# Patient Record
Sex: FEMALE | Race: WHITE | NOT HISPANIC OR LATINO | Employment: UNEMPLOYED | URBAN - METROPOLITAN AREA
[De-identification: names, ages, dates, MRNs, and addresses within clinical notes are randomized per-mention and may not be internally consistent; named-entity substitution may affect disease eponyms.]

---

## 2023-03-08 ENCOUNTER — TELEPHONE (OUTPATIENT)
Dept: PRIMARY CARE | Facility: CLINIC | Age: 83
End: 2023-03-08
Payer: MEDICARE

## 2023-03-08 NOTE — TELEPHONE ENCOUNTER
Patients daughter states she is retaining water and has gained 8 lbs in the last 5 days. She would like to know what she could do to help her. Please advise

## 2023-03-10 ENCOUNTER — TELEPHONE (OUTPATIENT)
Dept: PRIMARY CARE | Facility: CLINIC | Age: 83
End: 2023-03-10
Payer: MEDICARE

## 2023-04-03 DIAGNOSIS — R60.9 EDEMA, UNSPECIFIED: ICD-10-CM

## 2023-04-04 RX ORDER — FUROSEMIDE 40 MG/1
TABLET ORAL
Qty: 90 TABLET | Refills: 1 | Status: SHIPPED | OUTPATIENT
Start: 2023-04-04 | End: 2023-07-20 | Stop reason: SDUPTHER

## 2023-04-07 DIAGNOSIS — G25.81 RESTLESS LEGS SYNDROME: ICD-10-CM

## 2023-04-07 DIAGNOSIS — G47.00 INSOMNIA, UNSPECIFIED: ICD-10-CM

## 2023-04-07 DIAGNOSIS — F32.A DEPRESSION, UNSPECIFIED: ICD-10-CM

## 2023-04-07 DIAGNOSIS — K21.9 GASTRO-ESOPHAGEAL REFLUX DISEASE WITHOUT ESOPHAGITIS: ICD-10-CM

## 2023-04-07 RX ORDER — SERTRALINE HYDROCHLORIDE 100 MG/1
TABLET, FILM COATED ORAL
Qty: 90 TABLET | Refills: 1 | Status: SHIPPED | OUTPATIENT
Start: 2023-04-07

## 2023-04-07 RX ORDER — MIRTAZAPINE 7.5 MG/1
TABLET, FILM COATED ORAL
Qty: 90 TABLET | Refills: 1 | Status: SHIPPED | OUTPATIENT
Start: 2023-04-07 | End: 2023-07-20 | Stop reason: SDUPTHER

## 2023-04-07 RX ORDER — ROPINIROLE 2 MG/1
TABLET, FILM COATED ORAL
Qty: 90 TABLET | Refills: 1 | Status: SHIPPED | OUTPATIENT
Start: 2023-04-07

## 2023-04-07 RX ORDER — OMEPRAZOLE 40 MG/1
CAPSULE, DELAYED RELEASE ORAL
Qty: 90 CAPSULE | Refills: 1 | Status: SHIPPED | OUTPATIENT
Start: 2023-04-07

## 2023-04-08 DIAGNOSIS — E11.65 TYPE 2 DIABETES MELLITUS WITH HYPERGLYCEMIA, WITHOUT LONG-TERM CURRENT USE OF INSULIN (MULTI): Primary | ICD-10-CM

## 2023-04-12 ENCOUNTER — PATIENT OUTREACH (OUTPATIENT)
Dept: PRIMARY CARE | Facility: CLINIC | Age: 83
End: 2023-04-12
Payer: MEDICARE

## 2023-04-12 NOTE — PROGRESS NOTES
Discharge Facility: [facility]  Discharge Diagnosis: CHF  Admission Date: 8 Apr 23  Discharge Date: 11 Apr 23    PCP appt: [address] [place] 23    Engagement  Call Start Time: 1117 (4/12/2023 11:28 AM)    Medications  Medications reviewed with patient/caregiver?: Yes (permission to speak about medical issues obtained from patient so that i could speak with daughter who takes care of patient.) (4/12/2023 11:28 AM)  Is the patient having any side effects they believe may be caused by any medication additions or changes?: No (4/12/2023 11:28 AM)  Does the patient have all medications ordered at discharge?: No (pharmacy was in process of getting a few of the discharge meds together for patient. should be completed by today per daughter.) (4/12/2023 11:28 AM)  Care Management Interventions: No intervention needed (4/12/2023 11:28 AM)  Prescription Comments: daughter has been helping patient with meds for several years. does not wish to be contacted by pharmacy (4/12/2023 11:28 AM)  Is the patient taking all medications as directed (includes completed medication regime)?: Yes (4/12/2023 11:28 AM)    Appointments  Does the patient have a primary care provider?: Yes (4/12/2023 11:28 AM)  Care Management Interventions: Verified appointment date/time/provider (4/12/2023 11:28 AM)  Has the patient kept scheduled appointments due by today?: Yes (4/12/2023 11:28 AM)  Care Management Interventions: Advised patient to keep appointment; Educated on importance of keeping appointment (4/12/2023 11:28 AM)    Self Management  What is the home health agency?: daughter stating that HHC was already set up for patient prior to hospital stay. daughter did request a RN to visit. (4/12/2023 11:28 AM)  Has home health visited the patient within 72 hours of discharge?: Yes (4/12/2023 11:28 AM)  Home Health Interventions: Called home health agency (4/12/2023 11:28 AM)  What Durable Medical Equipment (DME) was ordered?: note states home O2, but daughter says  that was not needed on discharge due to patient maintaining high SpO2 levels (4/12/2023 11:28 AM)  Follow Up Tasks: Home Health (4/12/2023 11:28 AM)    Patient Teaching  Does the patient have access to their discharge instructions?: Yes (4/12/2023 11:28 AM)  Care Management Interventions: Reviewed instructions with patient (4/12/2023 11:28 AM)  What is the patient's perception of their health status since discharge?: Improving (4/12/2023 11:28 AM)  Is the patient/caregiver able to teach back the hierarchy of who to call/visit for symptoms/problems? PCP, Specialist, Home Health nurse, Urgent Care, ED, 911: Yes (4/12/2023 11:28 AM)    Wrap Up  Call End Time: 1133 (4/12/2023 11:28 AM)     [facility]HC contacted. Verified that patient was on their list. HHC to contact number in chart later today.

## 2023-04-14 ENCOUNTER — DOCUMENTATION (OUTPATIENT)
Dept: PRIMARY CARE | Facility: CLINIC | Age: 83
End: 2023-04-14
Payer: MEDICARE

## 2023-04-14 LAB
ANION GAP IN SER/PLAS: 19 MMOL/L (ref 10–20)
CALCIUM (MG/DL) IN SER/PLAS: 8.7 MG/DL (ref 8.6–10.6)
CARBON DIOXIDE, TOTAL (MMOL/L) IN SER/PLAS: 27 MMOL/L (ref 21–32)
CHLORIDE (MMOL/L) IN SER/PLAS: 98 MMOL/L (ref 98–107)
CREATININE (MG/DL) IN SER/PLAS: 1.35 MG/DL (ref 0.5–1.05)
GFR FEMALE: 39 ML/MIN/1.73M2
GLUCOSE (MG/DL) IN SER/PLAS: 331 MG/DL (ref 74–99)
POTASSIUM (MMOL/L) IN SER/PLAS: 3.8 MMOL/L (ref 3.5–5.3)
SODIUM (MMOL/L) IN SER/PLAS: 140 MMOL/L (ref 136–145)
UREA NITROGEN (MG/DL) IN SER/PLAS: 44 MG/DL (ref 6–23)

## 2023-04-14 NOTE — PROGRESS NOTES
Spoke with Jose from  Homecare  She had talked with Gilma's dtgr  Katty who said that she 'passed the walking test before discharge and was NOT sent home on Oxygen    Labs drawn today.  /56 and t 116/56  Pulse 66    128# today  Oxygenation 96-97%    Has follow up with me on 4/19

## 2023-04-19 ENCOUNTER — OFFICE VISIT (OUTPATIENT)
Dept: PRIMARY CARE | Facility: CLINIC | Age: 83
End: 2023-04-19
Payer: MEDICARE

## 2023-04-19 VITALS
HEIGHT: 60 IN | WEIGHT: 130 LBS | BODY MASS INDEX: 25.52 KG/M2 | SYSTOLIC BLOOD PRESSURE: 120 MMHG | DIASTOLIC BLOOD PRESSURE: 60 MMHG | TEMPERATURE: 97.6 F

## 2023-04-19 DIAGNOSIS — E11.65 TYPE 2 DIABETES MELLITUS WITH HYPERGLYCEMIA, WITHOUT LONG-TERM CURRENT USE OF INSULIN (MULTI): ICD-10-CM

## 2023-04-19 DIAGNOSIS — I50.9 CONGESTIVE HEART FAILURE, UNSPECIFIED HF CHRONICITY, UNSPECIFIED HEART FAILURE TYPE (MULTI): ICD-10-CM

## 2023-04-19 DIAGNOSIS — I10 PRIMARY HYPERTENSION: ICD-10-CM

## 2023-04-19 DIAGNOSIS — Z09 HOSPITAL DISCHARGE FOLLOW-UP: Primary | ICD-10-CM

## 2023-04-19 PROBLEM — M25.512 LEFT SHOULDER PAIN: Status: ACTIVE | Noted: 2023-04-19

## 2023-04-19 PROBLEM — I50.33 ACUTE ON CHRONIC DIASTOLIC HEART FAILURE (MULTI): Status: ACTIVE | Noted: 2023-04-19

## 2023-04-19 PROBLEM — E83.51 HYPOCALCEMIA: Status: ACTIVE | Noted: 2023-04-19

## 2023-04-19 PROBLEM — E11.9 TYPE 2 DIABETES MELLITUS (MULTI): Status: ACTIVE | Noted: 2023-04-19

## 2023-04-19 PROBLEM — K21.9 GERD (GASTROESOPHAGEAL REFLUX DISEASE): Status: ACTIVE | Noted: 2023-04-19

## 2023-04-19 PROBLEM — R06.2 WHEEZE: Status: ACTIVE | Noted: 2023-04-19

## 2023-04-19 PROBLEM — R41.0 CONFUSION: Status: ACTIVE | Noted: 2023-04-19

## 2023-04-19 PROBLEM — M79.622 PAIN OF LEFT UPPER ARM: Status: ACTIVE | Noted: 2023-04-19

## 2023-04-19 PROBLEM — S72.002A HIP FRACTURE, LEFT (MULTI): Status: ACTIVE | Noted: 2023-04-19

## 2023-04-19 PROBLEM — R93.1 ABNORMAL ECHOCARDIOGRAM: Status: ACTIVE | Noted: 2023-04-19

## 2023-04-19 PROBLEM — R26.81 UNSTEADY GAIT: Status: ACTIVE | Noted: 2023-04-19

## 2023-04-19 PROBLEM — I45.4 BUNDLE-BRANCH BLOCK: Status: ACTIVE | Noted: 2023-04-19

## 2023-04-19 PROBLEM — J98.01 BRONCHOSPASM, ACUTE: Status: ACTIVE | Noted: 2023-04-19

## 2023-04-19 PROBLEM — M25.562 PAIN IN JOINT INVOLVING LEFT LOWER LEG: Status: ACTIVE | Noted: 2023-04-19

## 2023-04-19 PROBLEM — B37.2 CANDIDIASIS OF SKIN: Status: ACTIVE | Noted: 2023-04-19

## 2023-04-19 PROBLEM — R79.89 ABNORMAL LFTS: Status: ACTIVE | Noted: 2023-04-19

## 2023-04-19 PROBLEM — N39.0 UTI (URINARY TRACT INFECTION): Status: ACTIVE | Noted: 2023-04-19

## 2023-04-19 PROBLEM — R60.9 EDEMA: Status: ACTIVE | Noted: 2023-04-19

## 2023-04-19 PROBLEM — G47.19 EXCESSIVE DAYTIME SLEEPINESS: Status: ACTIVE | Noted: 2023-04-19

## 2023-04-19 PROBLEM — T14.90XA TRAUMA: Status: ACTIVE | Noted: 2023-04-19

## 2023-04-19 PROBLEM — G25.81 RESTLESS LEG SYNDROME: Status: ACTIVE | Noted: 2023-04-19

## 2023-04-19 PROBLEM — G47.00 INSOMNIA: Status: ACTIVE | Noted: 2023-04-19

## 2023-04-19 PROBLEM — R09.02 HYPOXIA: Status: ACTIVE | Noted: 2023-04-19

## 2023-04-19 PROBLEM — D64.9 ABSOLUTE ANEMIA: Status: ACTIVE | Noted: 2023-04-19

## 2023-04-19 PROBLEM — B37.2 YEAST DERMATITIS: Status: ACTIVE | Noted: 2023-04-19

## 2023-04-19 PROBLEM — F32.A DEPRESSION: Status: ACTIVE | Noted: 2023-04-19
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
## 2023-04-19 PROCEDURE — 99496 TRANSJ CARE MGMT HIGH F2F 7D: CPT | Performed by: NURSE PRACTITIONER

## 2023-04-19 PROCEDURE — 3074F SYST BP LT 130 MM HG: CPT | Performed by: NURSE PRACTITIONER

## 2023-04-19 PROCEDURE — 1159F MED LIST DOCD IN RCRD: CPT | Performed by: NURSE PRACTITIONER

## 2023-04-19 PROCEDURE — 1160F RVW MEDS BY RX/DR IN RCRD: CPT | Performed by: NURSE PRACTITIONER

## 2023-04-19 PROCEDURE — 3078F DIAST BP <80 MM HG: CPT | Performed by: NURSE PRACTITIONER

## 2023-04-19 RX ORDER — IRBESARTAN 300 MG/1
300 TABLET ORAL DAILY
COMMUNITY

## 2023-04-19 RX ORDER — CARVEDILOL 12.5 MG/1
12.5 TABLET ORAL
COMMUNITY
End: 2023-07-06

## 2023-04-19 RX ORDER — ALBUTEROL SULFATE 0.83 MG/ML
2.5 SOLUTION RESPIRATORY (INHALATION) EVERY 6 HOURS PRN
COMMUNITY

## 2023-04-19 RX ORDER — AMLODIPINE BESYLATE 10 MG/1
10 TABLET ORAL DAILY
COMMUNITY

## 2023-04-19 ASSESSMENT — PATIENT HEALTH QUESTIONNAIRE - PHQ9
1. LITTLE INTEREST OR PLEASURE IN DOING THINGS: NOT AT ALL
2. FEELING DOWN, DEPRESSED OR HOPELESS: NOT AT ALL
SUM OF ALL RESPONSES TO PHQ9 QUESTIONS 1 AND 2: 0

## 2023-04-19 NOTE — PATIENT INSTRUCTIONS
Good to see you today.  Be consistent with your intake of water every day.  Try to keep a consistent routine.  Decrease the Norvasc (Amlodipine to 5 mg) once daily.    Check out the DASH DIET - the [employer] website is very helpful.'  Plan to follow up with me in mid-July.

## 2023-04-19 NOTE — PROGRESS NOTES
Subjective   Patient ID: Gilma Vuong is a 83 y.o. female who presents for Hospital Follow-up.    HPI Has been a rough month.  154#  on admission In hospital for 3 days.  Last week, pneumonia and CHF Sat- Tues  Cardiologist is Dr. Dickson  Breathing treatments not helping.  Admitted in the ER at [facility] - did not have a bed.   Went to [facility] and has been pleased with care there.      Home Care came on Friday.  Nurse Educator - in the yellow zone.    Some confusion about home oxygen but per Katty (dtgr)  she did pass the walking test without O2 just before discharge.      Saw Dr. Mancuso in [place] 3/21  and sugar went up to 200+ after shoulder injection.  Shoulder is better.    Doing better with water intake.  Still not consistent.      Review of Systems   All other systems reviewed and are negative.        Objective   /60   Temp 36.4 °C (97.6 °F)   Ht 1.524 m (5')   Wt 59 kg (130 lb)   BMI 25.39 kg/m²     Physical Exam  Vitals and nursing note reviewed.   Constitutional:       Appearance: Normal appearance.   HENT:      Head: Normocephalic and atraumatic.      Nose: Nose normal.      Mouth/Throat:      Mouth: Mucous membranes are moist.      Pharynx: Oropharynx is clear.   Neck:      Thyroid: No thyroid mass, thyromegaly or thyroid tenderness.   Cardiovascular:      Rate and Rhythm: Normal rate and regular rhythm.      Pulses: Normal pulses.      Heart sounds: Normal heart sounds.   Pulmonary:      Effort: Pulmonary effort is normal.      Breath sounds: Normal breath sounds.   Musculoskeletal:         General: Swelling present.      Cervical back: Normal range of motion and neck supple.      Comments: Trace BRADFORD   Lymphadenopathy:      Cervical: No cervical adenopathy.   Skin:     General: Skin is warm.      Capillary Refill: Capillary refill takes 2 to 3 seconds.   Neurological:      Mental Status: She is alert and oriented to person, place, and time. Mental status is at baseline.   Psychiatric:          Mood and Affect: Mood normal.         Behavior: Behavior normal.         Assessment/Plan   Problem List Items Addressed This Visit          Circulatory    CHF (congestive heart failure) (CMS/HCC)    Relevant Medications    amLODIPine (Norvasc) 10 mg tablet    carvedilol (Coreg) 12.5 mg tablet    Hypertension     Stable on Rx            Endocrine/Metabolic    Type 2 diabetes mellitus (CMS/HCC)       Other    Hospital discharge follow-up - Primary     [facility] Information and home health information reviewed.   Pt ed with dtgr about hydration, weight and SOB

## 2023-04-20 ENCOUNTER — PATIENT OUTREACH (OUTPATIENT)
Dept: PRIMARY CARE | Facility: CLINIC | Age: 83
End: 2023-04-20
Payer: MEDICARE

## 2023-04-20 NOTE — PROGRESS NOTES
Call regarding appt. with PCP on (19 Apr 23) after hospitalization.  At time of outreach call the patient feels as if their condition has improved since last visit.  Reviewed with patient the PCP appointment and any questions or concerns regarding the appt.

## 2023-04-30 PROBLEM — N39.0 UTI (URINARY TRACT INFECTION): Status: RESOLVED | Noted: 2023-04-19 | Resolved: 2023-04-30

## 2023-04-30 PROBLEM — Z09 HOSPITAL DISCHARGE FOLLOW-UP: Status: ACTIVE | Noted: 2023-04-30

## 2023-04-30 PROBLEM — M25.562 PAIN IN JOINT INVOLVING LEFT LOWER LEG: Status: RESOLVED | Noted: 2023-04-19 | Resolved: 2023-04-30

## 2023-04-30 PROBLEM — M79.622 PAIN OF LEFT UPPER ARM: Status: RESOLVED | Noted: 2023-04-19 | Resolved: 2023-04-30

## 2023-04-30 NOTE — ASSESSMENT & PLAN NOTE
[facility] Information and home health information reviewed.   Pt ed with dtgr about hydration, weight and SOB

## 2023-05-03 DIAGNOSIS — E11.65 TYPE 2 DIABETES MELLITUS WITH HYPERGLYCEMIA, WITHOUT LONG-TERM CURRENT USE OF INSULIN (MULTI): ICD-10-CM

## 2023-05-12 ENCOUNTER — DOCUMENTATION (OUTPATIENT)
Dept: PRIMARY CARE | Facility: CLINIC | Age: 83
End: 2023-05-12
Payer: MEDICARE

## 2023-05-24 ENCOUNTER — PATIENT OUTREACH (OUTPATIENT)
Dept: PRIMARY CARE | Facility: CLINIC | Age: 83
End: 2023-05-24
Payer: MEDICARE

## 2023-05-30 DIAGNOSIS — R41.0 CONFUSION: Primary | ICD-10-CM

## 2023-06-01 ENCOUNTER — LAB (OUTPATIENT)
Dept: LAB | Facility: LAB | Age: 83
End: 2023-06-01
Payer: MEDICARE

## 2023-06-01 DIAGNOSIS — R41.0 CONFUSION: ICD-10-CM

## 2023-06-01 PROCEDURE — 87077 CULTURE AEROBIC IDENTIFY: CPT

## 2023-06-01 PROCEDURE — 87186 SC STD MICRODIL/AGAR DIL: CPT

## 2023-06-01 PROCEDURE — 87086 URINE CULTURE/COLONY COUNT: CPT

## 2023-06-03 DIAGNOSIS — N30.00 ACUTE CYSTITIS WITHOUT HEMATURIA: Primary | ICD-10-CM

## 2023-06-03 LAB
URINE CULTURE: ABNORMAL
URINE CULTURE: ABNORMAL

## 2023-06-03 RX ORDER — NITROFURANTOIN 25; 75 MG/1; MG/1
100 CAPSULE ORAL 2 TIMES DAILY
Qty: 10 CAPSULE | Refills: 0 | Status: SHIPPED | OUTPATIENT
Start: 2023-06-03 | End: 2023-06-08

## 2023-06-20 DIAGNOSIS — N39.0 URINARY TRACT INFECTION WITHOUT HEMATURIA, SITE UNSPECIFIED: Primary | ICD-10-CM

## 2023-06-20 RX ORDER — NITROFURANTOIN 25; 75 MG/1; MG/1
100 CAPSULE ORAL 2 TIMES DAILY
Qty: 10 CAPSULE | Refills: 0 | Status: SHIPPED | OUTPATIENT
Start: 2023-06-20 | End: 2023-06-25

## 2023-07-06 DIAGNOSIS — I10 ESSENTIAL (PRIMARY) HYPERTENSION: ICD-10-CM

## 2023-07-06 RX ORDER — CHOLECALCIFEROL (VITAMIN D3) 25 MCG
1 TABLET ORAL DAILY
COMMUNITY

## 2023-07-06 RX ORDER — ASPIRIN 81 MG
1 TABLET, DELAYED RELEASE (ENTERIC COATED) ORAL 2 TIMES DAILY PRN
COMMUNITY
Start: 2022-07-07

## 2023-07-06 RX ORDER — ASCORBIC ACID 250 MG
TABLET ORAL
COMMUNITY
Start: 2022-07-07

## 2023-07-06 RX ORDER — LORATADINE 10 MG/1
TABLET ORAL
COMMUNITY

## 2023-07-06 RX ORDER — CALCIUM CARBONATE 300MG(750)
TABLET,CHEWABLE ORAL
COMMUNITY

## 2023-07-06 RX ORDER — CARVEDILOL 12.5 MG/1
TABLET ORAL
Qty: 180 TABLET | Refills: 3 | Status: SHIPPED | OUTPATIENT
Start: 2023-07-06

## 2023-07-06 RX ORDER — NYSTATIN 100000 U/G
CREAM TOPICAL
COMMUNITY

## 2023-07-11 ENCOUNTER — PATIENT OUTREACH (OUTPATIENT)
Dept: PRIMARY CARE | Facility: CLINIC | Age: 83
End: 2023-07-11
Payer: MEDICARE

## 2023-07-11 NOTE — PROGRESS NOTES
90 TCM outreach complete. No questions or concerns noted during call. Pt has graduated from TCM program

## 2023-07-20 ENCOUNTER — OFFICE VISIT (OUTPATIENT)
Dept: PRIMARY CARE | Facility: CLINIC | Age: 83
End: 2023-07-20
Payer: MEDICARE

## 2023-07-20 VITALS
HEIGHT: 60 IN | BODY MASS INDEX: 26.23 KG/M2 | HEART RATE: 58 BPM | TEMPERATURE: 98 F | DIASTOLIC BLOOD PRESSURE: 54 MMHG | SYSTOLIC BLOOD PRESSURE: 115 MMHG | WEIGHT: 133.6 LBS

## 2023-07-20 DIAGNOSIS — G47.00 INSOMNIA, UNSPECIFIED: ICD-10-CM

## 2023-07-20 DIAGNOSIS — I50.9 CHRONIC CONGESTIVE HEART FAILURE, UNSPECIFIED HEART FAILURE TYPE (MULTI): ICD-10-CM

## 2023-07-20 DIAGNOSIS — F34.1 PERSISTENT DEPRESSIVE DISORDER: ICD-10-CM

## 2023-07-20 DIAGNOSIS — I10 PRIMARY HYPERTENSION: ICD-10-CM

## 2023-07-20 DIAGNOSIS — R60.9 EDEMA, UNSPECIFIED: ICD-10-CM

## 2023-07-20 DIAGNOSIS — E11.65 TYPE 2 DIABETES MELLITUS WITH HYPERGLYCEMIA, WITHOUT LONG-TERM CURRENT USE OF INSULIN (MULTI): ICD-10-CM

## 2023-07-20 DIAGNOSIS — Z00.00 ROUTINE GENERAL MEDICAL EXAMINATION AT A HEALTH CARE FACILITY: Primary | ICD-10-CM

## 2023-07-20 PROCEDURE — 1126F AMNT PAIN NOTED NONE PRSNT: CPT | Performed by: NURSE PRACTITIONER

## 2023-07-20 PROCEDURE — G0439 PPPS, SUBSEQ VISIT: HCPCS | Performed by: NURSE PRACTITIONER

## 2023-07-20 PROCEDURE — 1160F RVW MEDS BY RX/DR IN RCRD: CPT | Performed by: NURSE PRACTITIONER

## 2023-07-20 PROCEDURE — 3078F DIAST BP <80 MM HG: CPT | Performed by: NURSE PRACTITIONER

## 2023-07-20 PROCEDURE — 3074F SYST BP LT 130 MM HG: CPT | Performed by: NURSE PRACTITIONER

## 2023-07-20 PROCEDURE — 1170F FXNL STATUS ASSESSED: CPT | Performed by: NURSE PRACTITIONER

## 2023-07-20 PROCEDURE — 1036F TOBACCO NON-USER: CPT | Performed by: NURSE PRACTITIONER

## 2023-07-20 PROCEDURE — 1159F MED LIST DOCD IN RCRD: CPT | Performed by: NURSE PRACTITIONER

## 2023-07-20 RX ORDER — FUROSEMIDE 40 MG/1
TABLET ORAL
Qty: 100 TABLET | Refills: 5 | Status: SHIPPED | OUTPATIENT
Start: 2023-07-20 | End: 2023-08-17 | Stop reason: SDUPTHER

## 2023-07-20 RX ORDER — MIRTAZAPINE 7.5 MG/1
15 TABLET, FILM COATED ORAL NIGHTLY
Qty: 30 TABLET | Refills: 5 | Status: SHIPPED | OUTPATIENT
Start: 2023-07-20 | End: 2023-07-31

## 2023-07-20 ASSESSMENT — ACTIVITIES OF DAILY LIVING (ADL)
TAKING_MEDICATION: TOTAL CARE
GROCERY_SHOPPING: TOTAL CARE
BATHING: INDEPENDENT
DOING_HOUSEWORK: NEEDS ASSISTANCE
DRESSING: INDEPENDENT
MANAGING_FINANCES: TOTAL CARE

## 2023-07-20 ASSESSMENT — ENCOUNTER SYMPTOMS
DEPRESSION: 0
LOSS OF SENSATION IN FEET: 0
OCCASIONAL FEELINGS OF UNSTEADINESS: 1

## 2023-07-20 ASSESSMENT — PATIENT HEALTH QUESTIONNAIRE - PHQ9
SUM OF ALL RESPONSES TO PHQ9 QUESTIONS 1 & 2: 0
1. LITTLE INTEREST OR PLEASURE IN DOING THINGS: NOT AT ALL
1. LITTLE INTEREST OR PLEASURE IN DOING THINGS: NOT AT ALL
SUM OF ALL RESPONSES TO PHQ9 QUESTIONS 1 AND 2: 0
2. FEELING DOWN, DEPRESSED OR HOPELESS: NOT AT ALL
2. FEELING DOWN, DEPRESSED OR HOPELESS: NOT AT ALL

## 2023-07-20 NOTE — PATIENT INSTRUCTIONS
Good to see you today.  Be as CONSISTENT as you can.  Use the Furosemide as instructed for weight gain.   Stay hydrated  Plan to follow up in early November.  Let me  know if you need anything.

## 2023-07-20 NOTE — PROGRESS NOTES
"Subjective   Reason for Visit: Gilma Vuong is an 83 y.o. female here for a Medicare Wellness visit.          Reviewed all medications by prescribing practitioner or clinical pharmacist (such as prescriptions, OTCs, herbal therapies and supplements) and documented in the medical record.    HPI  Cardiology increased Amlodipine  Here for Medicare [place]    Lasix:  40mg 2 times per day increase to 80 mg twice daily if needed based on increased daily weight.  Then has had \"Stopped up BM\"    Some trouble with constipation, then had uncontrolled diarrhea after treating.  By herself when dtgr at work.    Some household jobs like dishes and laundry.     Patient Care Team:  ALLAN Castro-CNP as PCP - General   Dr Mancuso - saw him for shoulder and is now 'fine'  Dr. Marsh, Cardiology - has seen his team in Hospital at [facility]    Review of Systems   Constitutional:  Positive for fatigue.   Cardiovascular:  Positive for leg swelling.   Gastrointestinal:  Positive for constipation and diarrhea.   Musculoskeletal:  Positive for gait problem.   All other systems reviewed and are negative.      Objective   Vitals:  /54   Pulse 58   Temp 36.7 °C (98 °F)   Ht 1.524 m (5')   Wt 60.6 kg (133 lb 9.6 oz)   BMI 26.09 kg/m²       Physical Exam  Vitals and nursing note reviewed.   HENT:      Head: Normocephalic.      Right Ear: Tympanic membrane, ear canal and external ear normal.      Left Ear: Tympanic membrane, ear canal and external ear normal.      Mouth/Throat:      Pharynx: Oropharynx is clear.      Comments: Edendulous  Eyes:      Conjunctiva/sclera: Conjunctivae normal.      Pupils: Pupils are equal, round, and reactive to light.   Neck:      Thyroid: No thyroid mass, thyromegaly or thyroid tenderness.   Cardiovascular:      Rate and Rhythm: Normal rate and regular rhythm.      Pulses: Normal pulses.      Heart sounds: Normal heart sounds.   Pulmonary:      Effort: Pulmonary effort is normal.      Breath sounds: " Normal breath sounds.   Abdominal:      General: Bowel sounds are normal.      Palpations: Abdomen is soft.   Musculoskeletal:      Cervical back: Normal range of motion and neck supple.      Comments: Sitting comfortably in wheelchair.  Able to stand and ambulate holding on x few steps   Skin:     General: Skin is warm.   Neurological:      Mental Status: She is alert and oriented to person, place, and time. Mental status is at baseline.   Psychiatric:         Mood and Affect: Mood normal.         Behavior: Behavior normal.      Comments: Some mental decline per dtg  Pleasant and  cooperative     Here with dtgr Katty today.    Katty has a new job and they will be moving back to [place] with family this fall.      Assessment/Plan   Problem List Items Addressed This Visit    None  Diagnoses and all orders for this visit:  Routine general medical examination at a health care facility  Insomnia, unspecified  -     mirtazapine (Remeron) 7.5 mg tablet; Take 2 tablets (15 mg) by mouth once daily at bedtime.  Edema, unspecified  -     furosemide (Lasix) 40 mg tablet; Take one pill twice daily every day; and two pills twice daily when weight increases more than 3 pounds per day.  Primary hypertension  Chronic congestive heart failure, unspecified heart failure type (CMS/HCC)  Type 2 diabetes mellitus with hyperglycemia, without long-term current use of insulin (CMS/HCC)  Persistent depressive disorder

## 2023-07-30 DIAGNOSIS — G47.00 INSOMNIA, UNSPECIFIED: ICD-10-CM

## 2023-07-31 PROBLEM — D61.818 PANCYTOPENIA (MULTI): Status: ACTIVE | Noted: 2023-07-31

## 2023-07-31 PROBLEM — Z00.00 ROUTINE GENERAL MEDICAL EXAMINATION AT A HEALTH CARE FACILITY: Status: ACTIVE | Noted: 2023-07-31

## 2023-07-31 PROBLEM — D69.6 DISORDER INVOLVING THROMBOCYTOPENIA (CMS-HCC): Status: ACTIVE | Noted: 2023-07-31

## 2023-07-31 PROBLEM — T14.90XA TRAUMA: Status: RESOLVED | Noted: 2023-04-19 | Resolved: 2023-07-31

## 2023-07-31 RX ORDER — MIRTAZAPINE 7.5 MG/1
15 TABLET, FILM COATED ORAL NIGHTLY
Qty: 180 TABLET | Refills: 1 | Status: SHIPPED | OUTPATIENT
Start: 2023-07-31

## 2023-07-31 ASSESSMENT — ENCOUNTER SYMPTOMS
CONSTIPATION: 1
FATIGUE: 1
DIARRHEA: 1

## 2023-07-31 NOTE — ASSESSMENT & PLAN NOTE
Stable on Rx - long discussion about use of 'extra Lasix' and impact on fluids/hydration, constipation, etc.

## 2023-08-17 ENCOUNTER — OFFICE VISIT (OUTPATIENT)
Dept: PRIMARY CARE | Facility: CLINIC | Age: 83
End: 2023-08-17
Payer: MEDICARE

## 2023-08-17 VITALS
SYSTOLIC BLOOD PRESSURE: 127 MMHG | WEIGHT: 134.8 LBS | HEART RATE: 58 BPM | TEMPERATURE: 97.8 F | BODY MASS INDEX: 26.33 KG/M2 | DIASTOLIC BLOOD PRESSURE: 55 MMHG

## 2023-08-17 DIAGNOSIS — R60.9 EDEMA, UNSPECIFIED: ICD-10-CM

## 2023-08-17 DIAGNOSIS — E11.65 TYPE 2 DIABETES MELLITUS WITH HYPERGLYCEMIA, WITHOUT LONG-TERM CURRENT USE OF INSULIN (MULTI): ICD-10-CM

## 2023-08-17 PROCEDURE — 99214 OFFICE O/P EST MOD 30 MIN: CPT | Performed by: NURSE PRACTITIONER

## 2023-08-17 PROCEDURE — 1159F MED LIST DOCD IN RCRD: CPT | Performed by: NURSE PRACTITIONER

## 2023-08-17 PROCEDURE — 3074F SYST BP LT 130 MM HG: CPT | Performed by: NURSE PRACTITIONER

## 2023-08-17 PROCEDURE — 3078F DIAST BP <80 MM HG: CPT | Performed by: NURSE PRACTITIONER

## 2023-08-17 PROCEDURE — 1126F AMNT PAIN NOTED NONE PRSNT: CPT | Performed by: NURSE PRACTITIONER

## 2023-08-17 PROCEDURE — 1160F RVW MEDS BY RX/DR IN RCRD: CPT | Performed by: NURSE PRACTITIONER

## 2023-08-17 PROCEDURE — 1036F TOBACCO NON-USER: CPT | Performed by: NURSE PRACTITIONER

## 2023-08-17 RX ORDER — FUROSEMIDE 40 MG/1
TABLET ORAL
Qty: 100 TABLET | Refills: 1 | Status: SHIPPED | OUTPATIENT
Start: 2023-08-17

## 2023-08-17 NOTE — PROGRESS NOTES
Subjective   Patient ID: Gilma Vuong is a 83 y.o. female who presents for Follow-up.    HPI Flying out on 8/26    9/15 Katty will be out of town.    Son and friends will be close by.    Only had to take extra Lasix once   Seems to be better with fluids/salt/swelling  Will go back to PCP she used to have - she knows her.    Happy to be going back home!   Needs a couple refills.      Review of Systems   All other systems reviewed and are negative.      Objective   /55   Pulse 58   Temp 36.6 °C (97.8 °F)   Wt 61.1 kg (134 lb 12.8 oz)   BMI 26.33 kg/m²     Physical Exam  Vitals and nursing note reviewed.   Constitutional:       Comments: Elderly CF here with dtgr at side   Cardiovascular:      Rate and Rhythm: Normal rate and regular rhythm.      Heart sounds: Normal heart sounds.      Comments: NO EDEMA in lower legs today  Pulmonary:      Effort: Pulmonary effort is normal.      Breath sounds: Normal breath sounds.   Abdominal:      General: Bowel sounds are normal.      Palpations: Abdomen is soft.   Musculoskeletal:         General: No swelling.   Neurological:      Mental Status: She is alert and oriented to person, place, and time.   Psychiatric:         Mood and Affect: Mood normal.         Behavior: Behavior normal.         Assessment/Plan   Problem List Items Addressed This Visit       Edema, unspecified    Relevant Medications    furosemide (Lasix) 40 mg tablet    Type 2 diabetes mellitus (CMS/HCC)    Relevant Medications    SITagliptin phosphate (Januvia) 100 mg tablet

## 2023-08-17 NOTE — PATIENT INSTRUCTIONS
Good to see you today.  All the best to you and Katty with your move back 'home'.  Continue with the current medications, daily weights and good hydration.  Records release form to be completed for transfer of records.

## 2025-07-07 NOTE — PROGRESS NOTES
Patient resting in bed, patient is pink, warm, and dry. Respirations E/E on room air. Iv site unremarkable. No S/S of acute distress noted. Head to toe completed at this time. Patient is A/O x4. Medication given patient tolerated well. Call light in easy reach, patient reminded to use call light for needs and assistance. Bed locked and in lowest position side rails up x2.    Rx for 5 days of Nitrofurantoin sent to pharmacy